# Patient Record
(demographics unavailable — no encounter records)

---

## 2025-03-20 NOTE — DATA REVIEWED
[FreeTextEntry1] : Audiogram ordered to assess for sensorineural +/- conductive hearing loss Results: CHL AD>AS. abnormal tympanometry AU

## 2025-03-20 NOTE — BIRTH HISTORY
[At Term] : at term [Passed] : passed [ Section] : by  section [None] : No delivery complications [de-identified] : Hypotension

## 2025-03-20 NOTE — CONSULT LETTER
[Dear  ___] : Dear  [unfilled], [Consult Letter:] : I had the pleasure of evaluating your patient, [unfilled]. [Please see my note below.] : Please see my note below. [Consult Closing:] : Thank you very much for allowing me to participate in the care of this patient.  If you have any questions, please do not hesitate to contact me. [Sincerely,] : Sincerely, [FreeTextEntry2] : Benji Real MD  [FreeTextEntry3] : Daryl Knight MD, PhD Chief, Division of Laryngology Department of Otolaryngology [facility] Pediatric Otolaryngology, [facility]  of Otolaryngology [facility]

## 2025-03-20 NOTE — REASON FOR VISIT
[Initial Evaluation] : an initial evaluation for [Parents] : parents [FreeTextEntry2] : recurrent ear infections

## 2025-03-20 NOTE — REVIEW OF SYSTEMS
[Negative] : Heme/Lymph [de-identified] : as per HPI  [de-identified] : as per HPI  [de-identified] : as per HPI

## 2025-03-20 NOTE — HISTORY OF PRESENT ILLNESS
[No Personal or Family History of Easy Bruising, Bleeding, or Issues with General Anesthesia] : No Personal or Family History of easy bruising, bleeding, or issues with general anesthesia [de-identified] : 21 old month old girl presents with recurrent ear infections  Born full term, uncomplicated  birth, passed [facility]  Sibling has hx of autism  Reports at least 4-5 ear infections for the past 6 months, all treated with antibiotics  Last Left ear infection 2025 Reports intermittent pulling/tugging.  Reports frequent nasal congestion, anterior rhinorrhea- using saline and suction PRN  No Snoring or witnessed apneas.  Pending speech evaluation, mom believes she may have a speech delay- has about 15 word in vocabulary.  Denies otorrhea and ear bleed  Patient denies dysphagia, cough, neck swelling or throat infections

## 2025-03-20 NOTE — ADDENDUM
[FreeTextEntry1] :   Documented by Jarred Loyola acting as scribe for Dr. Knight on 03/20/2025. All Medical record entries made by the Scribe were at my, Dr. Knight, direction and personally dictated by me on 03/20/2025 . I have reviewed the chart and agree that the record accurately reflects my personal performance of the history, physical exam, assessment and plan. I have also personally directed, reviewed, and agreed with the discharge instructions.

## 2025-06-26 NOTE — REVIEW OF SYSTEMS
[Negative] : Heme/Lymph [de-identified] : as per HPI  [de-identified] : as per HPI  [de-identified] : as per HPI

## 2025-06-26 NOTE — ADDENDUM
[FreeTextEntry1] :   Documented by Jarred Loyola acting as scribe for Dr. Knight on 06/26/2025. All Medical record entries made by the Scribe were at my, Dr. Knight, direction and personally dictated by me on 06/26/2025 . I have reviewed the chart and agree that the record accurately reflects my personal performance of the history, physical exam, assessment and plan. I have also personally directed, reviewed, and agreed with the discharge instructions.

## 2025-06-26 NOTE — HISTORY OF PRESENT ILLNESS
[No Personal or Family History of Easy Bruising, Bleeding, or Issues with General Anesthesia] : No Personal or Family History of easy bruising, bleeding, or issues with general anesthesia [de-identified] : 2 year old girl presents with recurrent ear infections  Born full term, uncomplicated  birth, passed [facility] Now s/p BMT and nasal endoscopy 25  Sibling has hx of autism  Denies otorrhea and ear bleed  Patient denies dysphagia, cough, neck swelling or throat infections

## 2025-06-26 NOTE — BIRTH HISTORY
[At Term] : at term [ Section] : by  section [None] : No delivery complications [Passed] : passed [de-identified] : Hypotension

## 2025-06-26 NOTE — CONSULT LETTER
[Dear  ___] : Dear  [unfilled], [Courtesy Letter:] : I had the pleasure of seeing your patient, [unfilled], in my office today. [Please see my note below.] : Please see my note below. [Consult Closing:] : Thank you very much for allowing me to participate in the care of this patient.  If you have any questions, please do not hesitate to contact me. [Sincerely,] : Sincerely, [FreeTextEntry2] : Benji Real MD  [FreeTextEntry3] : Daryl Knight MD, PhD Chief, Division of Laryngology Department of Otolaryngology [facility] Pediatric Otolaryngology, [facility]  of Otolaryngology [facility]